# Patient Record
Sex: FEMALE | Race: WHITE | NOT HISPANIC OR LATINO | Employment: FULL TIME | URBAN - NONMETROPOLITAN AREA
[De-identification: names, ages, dates, MRNs, and addresses within clinical notes are randomized per-mention and may not be internally consistent; named-entity substitution may affect disease eponyms.]

---

## 2023-08-13 DIAGNOSIS — E87.6 HYPOKALEMIA: ICD-10-CM

## 2023-08-13 DIAGNOSIS — I10 ESSENTIAL (PRIMARY) HYPERTENSION: ICD-10-CM

## 2023-08-14 RX ORDER — POTASSIUM CHLORIDE 750 MG/1
10 CAPSULE, EXTENDED RELEASE ORAL DAILY
Qty: 90 CAPSULE | Refills: 3 | Status: SHIPPED | OUTPATIENT
Start: 2023-08-14 | End: 2024-01-26 | Stop reason: SDUPTHER

## 2023-08-14 RX ORDER — LOSARTAN POTASSIUM AND HYDROCHLOROTHIAZIDE 12.5; 5 MG/1; MG/1
1 TABLET ORAL DAILY
Qty: 90 TABLET | Refills: 1 | Status: SHIPPED | OUTPATIENT
Start: 2023-08-14 | End: 2024-01-26 | Stop reason: SDUPTHER

## 2023-08-14 RX ORDER — ALLOPURINOL 300 MG/1
300 TABLET ORAL DAILY
COMMUNITY
Start: 2023-06-12 | End: 2024-01-26 | Stop reason: ALTCHOICE

## 2023-08-14 RX ORDER — LOSARTAN POTASSIUM AND HYDROCHLOROTHIAZIDE 12.5; 5 MG/1; MG/1
1 TABLET ORAL DAILY
COMMUNITY

## 2023-08-14 RX ORDER — POTASSIUM CHLORIDE 750 MG/1
10 CAPSULE, EXTENDED RELEASE ORAL DAILY
COMMUNITY

## 2023-10-03 DIAGNOSIS — Z12.11 SCREENING FOR COLON CANCER: ICD-10-CM

## 2023-12-16 ENCOUNTER — HOSPITAL ENCOUNTER (OUTPATIENT)
Dept: RADIOLOGY | Facility: HOSPITAL | Age: 58
Discharge: HOME | End: 2023-12-16
Payer: COMMERCIAL

## 2023-12-16 DIAGNOSIS — Z12.31 ENCOUNTER FOR SCREENING MAMMOGRAM FOR MALIGNANT NEOPLASM OF BREAST: ICD-10-CM

## 2024-01-26 ENCOUNTER — OFFICE VISIT (OUTPATIENT)
Dept: OBSTETRICS AND GYNECOLOGY | Facility: CLINIC | Age: 59
End: 2024-01-26
Payer: COMMERCIAL

## 2024-01-26 VITALS
DIASTOLIC BLOOD PRESSURE: 87 MMHG | BODY MASS INDEX: 38.74 KG/M2 | SYSTOLIC BLOOD PRESSURE: 156 MMHG | HEIGHT: 65 IN | HEART RATE: 76 BPM | WEIGHT: 232.5 LBS

## 2024-01-26 DIAGNOSIS — Z12.4 CERVICAL CANCER SCREENING: ICD-10-CM

## 2024-01-26 DIAGNOSIS — Z01.419 WOMEN'S ANNUAL ROUTINE GYNECOLOGICAL EXAMINATION: Primary | ICD-10-CM

## 2024-01-26 PROCEDURE — 87624 HPV HI-RISK TYP POOLED RSLT: CPT | Performed by: OBSTETRICS & GYNECOLOGY

## 2024-01-26 PROCEDURE — 99386 PREV VISIT NEW AGE 40-64: CPT | Performed by: OBSTETRICS & GYNECOLOGY

## 2024-01-26 PROCEDURE — 1036F TOBACCO NON-USER: CPT | Performed by: OBSTETRICS & GYNECOLOGY

## 2024-01-26 PROCEDURE — 88175 CYTOPATH C/V AUTO FLUID REDO: CPT | Mod: TC | Performed by: OBSTETRICS & GYNECOLOGY

## 2024-01-26 ASSESSMENT — PAIN SCALES - GENERAL: PAINLEVEL: 0-NO PAIN

## 2024-01-26 NOTE — PROGRESS NOTES
"      Taya Olivier 58 y.o. y/o who presents for annual gyn exam.      Preventive health  - Pap due, cotest  - mammogram - has appointment this month  - c-scope due in 2028    Concern for pelvic organ prolapse  - no evidence on exam (minimal posterior wall prolpase), but nothing to match the description that I first suspected on patient's exam.  Discussed possibility that does have prolapse, can be intermittent.  No associated urinary symptoms.  Will continue to monitor.      -------------------------------------  HPI    No Pap in years  Had pain/trauma with last exam, and has had fears about returning    Thinks that something is falling out  Was told by  that he felt \"ridges\" during intercourse  Has not had sex in 7 months    Denies vaginal bleeding  No urinary symptoms  previously had urinary urgency previously, states this resolved with initiation of losartan for elevated BP  Up to date on colonscopy    Gynecologic History:    Menses: LMP 2 years ago  Monthly premenopausal  No vaginal bleeding since menopause.  Last Pap: 21 years ago  Sexually active: yes, but not for months due to 's/her concern       OB History:   P3  SVDs, no complications      Vitals:    01/26/24 0832   BP: 156/87   Pulse: 76       Physical Exam  Constitutional:       Appearance: Normal appearance.   Genitourinary:      Vulva and bladder normal.      No vaginal erythema, bleeding or ulceration.      Posterior vaginal prolapse present.     Mild vaginal atrophy present.     Cervix is parous.      No urethral prolapse or mass present.      Levator ani not tender.  HENT:      Head: Normocephalic and atraumatic.   Pulmonary:      Effort: Pulmonary effort is normal.   Musculoskeletal:      Cervical back: Neck supple.   Neurological:      Mental Status: She is alert.   Skin:     General: Skin is warm and dry.   Psychiatric:         Mood and Affect: Mood normal.         Thought Content: Thought content normal.     No evidence of " significant pelvic organ prolapse

## 2024-01-29 ENCOUNTER — HOSPITAL ENCOUNTER (OUTPATIENT)
Dept: RADIOLOGY | Facility: HOSPITAL | Age: 59
Discharge: HOME | End: 2024-01-29
Payer: COMMERCIAL

## 2024-01-29 PROCEDURE — 77063 BREAST TOMOSYNTHESIS BI: CPT | Mod: BILATERAL PROCEDURE | Performed by: RADIOLOGY

## 2024-01-29 PROCEDURE — 77067 SCR MAMMO BI INCL CAD: CPT | Mod: BILATERAL PROCEDURE | Performed by: RADIOLOGY

## 2024-01-29 PROCEDURE — 77067 SCR MAMMO BI INCL CAD: CPT

## 2024-01-30 ENCOUNTER — TELEPHONE (OUTPATIENT)
Dept: SURGERY | Facility: CLINIC | Age: 59
End: 2024-01-30
Payer: COMMERCIAL

## 2024-01-30 NOTE — TELEPHONE ENCOUNTER
----- Message from Remigio Ferrer MD sent at 1/29/2024  5:39 PM EST -----  Let her know her mammogram was normal.  She can continue yearly screening mammogram

## 2024-01-31 ENCOUNTER — TELEPHONE (OUTPATIENT)
Dept: SURGERY | Facility: CLINIC | Age: 59
End: 2024-01-31
Payer: COMMERCIAL

## 2024-02-01 ENCOUNTER — TELEPHONE (OUTPATIENT)
Dept: SURGERY | Facility: CLINIC | Age: 59
End: 2024-02-01
Payer: COMMERCIAL

## 2024-02-01 NOTE — TELEPHONE ENCOUNTER
----- Message from Remigio eFrrer MD sent at 1/29/2024  5:39 PM EST -----  Let her know her mammogram was normal.  She can continue yearly screening mammogram

## 2024-02-08 LAB
CYTOLOGY CMNT CVX/VAG CYTO-IMP: NORMAL
HPV HR 12 DNA GENITAL QL NAA+PROBE: NEGATIVE
HPV HR GENOTYPES PNL CVX NAA+PROBE: NEGATIVE
HPV16 DNA SPEC QL NAA+PROBE: NEGATIVE
HPV18 DNA SPEC QL NAA+PROBE: NEGATIVE
LAB AP HPV GENOTYPE QUESTION: YES
LAB AP HPV HR: NORMAL
LABORATORY COMMENT REPORT: NORMAL
PATH REPORT.TOTAL CANCER: NORMAL

## 2024-06-24 ENCOUNTER — TELEPHONE (OUTPATIENT)
Dept: PRIMARY CARE | Facility: CLINIC | Age: 59
End: 2024-06-24
Payer: COMMERCIAL

## 2024-06-24 DIAGNOSIS — I10 PRIMARY HYPERTENSION: Primary | ICD-10-CM

## 2024-06-24 RX ORDER — LOSARTAN POTASSIUM AND HYDROCHLOROTHIAZIDE 12.5; 5 MG/1; MG/1
1 TABLET ORAL DAILY
Qty: 30 TABLET | Refills: 0 | Status: SHIPPED | OUTPATIENT
Start: 2024-06-24 | End: 2024-07-24

## 2024-06-24 NOTE — TELEPHONE ENCOUNTER
Taya has not been seen in office since January 202. I explained that she is technically no longer a patient and that I cannot guarantee her medication will get filled, but she is currently in the process of transferring jobs to [place], [place] and she forgot her medications. She only needs a weeks worth of losartan-hydrochlorothiazide.

## 2024-07-18 DIAGNOSIS — I10 PRIMARY HYPERTENSION: ICD-10-CM

## 2024-07-22 RX ORDER — FEBUXOSTAT 40 MG/1
1 TABLET, FILM COATED ORAL
COMMUNITY
Start: 2024-04-19

## 2024-07-22 RX ORDER — LOSARTAN POTASSIUM AND HYDROCHLOROTHIAZIDE 12.5; 5 MG/1; MG/1
1 TABLET ORAL DAILY
Qty: 90 TABLET | Refills: 1 | Status: SHIPPED | OUTPATIENT
Start: 2024-07-22

## 2024-09-11 NOTE — TELEPHONE ENCOUNTER
Pt notified   Unable to obtain oral temp. Axillary temp 96.0. Demario Kiraner applied.   [Abdominal Pain] : abdominal pain [Nausea] : no nausea [Constipation] : constipation [Diarrhea] : diarrhea [Vomiting] : no vomiting [Heartburn] : no heartburn [Melena] : no melena [Negative] : Heme/Lymph

## 2024-10-15 NOTE — PROGRESS NOTES
Subjective   Patient ID: Taya Olivier is a 59 y.o. female who presents for No chief complaint on file..    HTN: No SE's on losartan, HCTZ. She is on potassium replacement. She has been out of her HCTZ for a week. She did not take her medication yet today. No side effects.     h/o CVA, vision changes: getting injections in her left eye for floaters. She was seeing Dr. Foote from [facility]. She is cleared unless she has another problem.     Nephrolithiasis: She was found to have a kidney stone. She is seeing a urologist in [place] for a procedure. She has had kidney stones before. She is not in pain today. She is planning for procedure in March.     Breast mass: Biopsy and removal completed by gen surg, found to be benign. She is doing well.     Both of her kids are theater majors. Her son gets work pretty frequently but her daughter is not as motivated. She is going to [employer].     All other systems have been reviewed and are negative for complaint     Objective   There were no vitals taken for this visit.    Gen: No acute distress, alert and oriented x3, pleasant   HEENT: moist mucous membranes, b/l external auditory canals are clear of debris, TMs within normal limits, no oropharyngeal lesions, eomi, perrla   Neck: thyroid within normal limits, no lymphadenopathy   CV: RRR, normal S1/S2, no murmur   Resp: Clear to auscultation bilaterally, no wheezes or rhonchi appreciated  Abd: soft, nontender, non-distended, no guarding/rigidity, bowel sounds present  Extr: no edema, no calf tenderness  Derm: Skin is warm and dry, no rashes appreciated  Psych: mood is good, affect is congruent, good hygiene, normal speech and eye contact  Neuro: cranial nerves grossly intact, normal gait    Assessment/Plan   #HTN  No issues with combo pill     #Hypokalemia  stable      #Hyperglycemia:  Monitoring a1c     #Nephrolithiasis:  Planning for procedure with urology in March     #Intraductal papilloma:  s/p biopsy  and removal, found to be benign  Ordering mammogram today     #h/o CVA:  Vision is much better  f/u as needed with retinal specialist     Health Maintenance  Ordering lipid panel  She is planning to do well woman exam with new gyn in [place]

## 2024-10-17 ENCOUNTER — OFFICE VISIT (OUTPATIENT)
Dept: PRIMARY CARE | Facility: CLINIC | Age: 59
End: 2024-10-17

## 2024-10-17 VITALS
BODY MASS INDEX: 36.8 KG/M2 | WEIGHT: 229 LBS | HEART RATE: 74 BPM | SYSTOLIC BLOOD PRESSURE: 135 MMHG | DIASTOLIC BLOOD PRESSURE: 80 MMHG | HEIGHT: 66 IN

## 2024-10-17 DIAGNOSIS — R73.09 ELEVATED GLUCOSE: Primary | ICD-10-CM

## 2024-10-17 DIAGNOSIS — Z78.9 HEALTH MANAGEMENT DEFICIT: ICD-10-CM

## 2024-10-17 PROCEDURE — 1036F TOBACCO NON-USER: CPT | Performed by: FAMILY MEDICINE

## 2024-10-17 PROCEDURE — 3008F BODY MASS INDEX DOCD: CPT | Performed by: FAMILY MEDICINE

## 2024-10-17 PROCEDURE — 99214 OFFICE O/P EST MOD 30 MIN: CPT | Performed by: FAMILY MEDICINE

## 2024-10-17 NOTE — PROGRESS NOTES
"Subjective   Patient ID: Tyaa Olivier is a 59 y.o. female who presents for Follow-up (\"I can't loose wt\"; she walks; go to the gym with no results;).    HTN: No SE's on losartan, HCTZ. She is on potassium replacement.      h/o CVA, vision changes: getting injections in her left eye for floaters. She was seeing Dr. Foote from [facility]. She is cleared unless she has another problem.     Nephrolithiasis: She is on uloric. She had extensive testing done. Following with urology. She is not eating red meat anymore. Following with Dr. Chao for urology but she will be seeing a new doc at [place] the next time she goes.      Breast mass: Biopsy and removal completed by gen surg, found to be benign. She is doing well.     One is in [place], One in [place], and one in [place]. She has been working for [employer] for many years but since the [place] store has closed she is in [facility] for 18mo while waiting for a position     All other systems have been reviewed and are negative for complaint     Objective   /84 (BP Location: Right arm, Patient Position: Sitting, BP Cuff Size: Adult)   Pulse 74   Ht 1.676 m (5' 6\")   Wt 104 kg (229 lb)   BMI 36.96 kg/m²     Gen: No acute distress, alert and oriented x3, pleasant   HEENT: moist mucous membranes, b/l external auditory canals are clear of debris, TMs within normal limits, no oropharyngeal lesions, eomi, perrla   Neck: thyroid within normal limits, no lymphadenopathy   CV: RRR, normal S1/S2, no murmur   Resp: Clear to auscultation bilaterally, no wheezes or rhonchi appreciated  Abd: soft, nontender, non-distended, no guarding/rigidity, bowel sounds present  Extr: no edema, no calf tenderness  Derm: Skin is warm and dry, no rashes appreciated  Psych: mood is good, affect is congruent, good hygiene, normal speech and eye contact  Neuro: cranial nerves grossly intact, normal gait    Assessment/Plan   #Obesity:  Discussed weight loss medication " options  Check labs  She will query about GLP-1 coverage with insurance also    #HTN  #Hypokalemia  Reasonable control on losartan_HCTZ with potassium     #Hyperglycemia:  Monitoring a1c     #Nephrolithiasis:  On Uloric     #Intraductal papilloma:  Following with surgeon     #h/o CVA:  Vision is much better  f/u as needed with retinal specialist     Health Maintenance  Ordering lipid panel  She is planning to do well woman exam with new gyn in [place]   Pap done 1/2024  Mammogram [facility] [place]

## 2024-10-17 NOTE — PATIENT INSTRUCTIONS
If you're motivated to try a GLP-1, I would call your insurance and ask if they cover this for weight loss.  The most common ones would be trulicity, ozempic, mounjaro, wegovy, and zepbound.

## 2024-10-18 ENCOUNTER — APPOINTMENT (OUTPATIENT)
Dept: PRIMARY CARE | Facility: CLINIC | Age: 59
End: 2024-10-18
Payer: COMMERCIAL

## 2024-10-18 ENCOUNTER — LAB (OUTPATIENT)
Dept: LAB | Facility: LAB | Age: 59
End: 2024-10-18
Payer: COMMERCIAL

## 2024-10-18 DIAGNOSIS — R73.09 ELEVATED GLUCOSE: ICD-10-CM

## 2024-10-18 DIAGNOSIS — Z78.9 HEALTH MANAGEMENT DEFICIT: ICD-10-CM

## 2024-10-18 LAB
ALBUMIN SERPL BCP-MCNC: 4.1 G/DL (ref 3.4–5)
ALP SERPL-CCNC: 85 U/L (ref 33–110)
ALT SERPL W P-5'-P-CCNC: 37 U/L (ref 7–45)
ANION GAP SERPL CALC-SCNC: 11 MMOL/L (ref 10–20)
AST SERPL W P-5'-P-CCNC: 27 U/L (ref 9–39)
BASOPHILS # BLD AUTO: 0.04 X10*3/UL (ref 0–0.1)
BASOPHILS NFR BLD AUTO: 0.5 %
BILIRUB SERPL-MCNC: 0.6 MG/DL (ref 0–1.2)
BUN SERPL-MCNC: 20 MG/DL (ref 6–23)
CALCIUM SERPL-MCNC: 9 MG/DL (ref 8.6–10.3)
CHLORIDE SERPL-SCNC: 104 MMOL/L (ref 98–107)
CHOLEST SERPL-MCNC: 233 MG/DL (ref 0–199)
CHOLESTEROL/HDL RATIO: 6.3
CO2 SERPL-SCNC: 31 MMOL/L (ref 21–32)
CREAT SERPL-MCNC: 0.95 MG/DL (ref 0.5–1.05)
EGFRCR SERPLBLD CKD-EPI 2021: 69 ML/MIN/1.73M*2
EOSINOPHIL # BLD AUTO: 0.13 X10*3/UL (ref 0–0.7)
EOSINOPHIL NFR BLD AUTO: 1.5 %
ERYTHROCYTE [DISTWIDTH] IN BLOOD BY AUTOMATED COUNT: 12.4 % (ref 11.5–14.5)
EST. AVERAGE GLUCOSE BLD GHB EST-MCNC: 134 MG/DL
GLUCOSE SERPL-MCNC: 143 MG/DL (ref 74–99)
HBA1C MFR BLD: 6.3 %
HCT VFR BLD AUTO: 44.4 % (ref 36–46)
HDLC SERPL-MCNC: 37.2 MG/DL
HGB BLD-MCNC: 14.2 G/DL (ref 12–16)
IMM GRANULOCYTES # BLD AUTO: 0.03 X10*3/UL (ref 0–0.7)
IMM GRANULOCYTES NFR BLD AUTO: 0.3 % (ref 0–0.9)
LDLC SERPL CALC-MCNC: 143 MG/DL
LYMPHOCYTES # BLD AUTO: 2.64 X10*3/UL (ref 1.2–4.8)
LYMPHOCYTES NFR BLD AUTO: 29.9 %
MCH RBC QN AUTO: 27.6 PG (ref 26–34)
MCHC RBC AUTO-ENTMCNC: 32 G/DL (ref 32–36)
MCV RBC AUTO: 86 FL (ref 80–100)
MONOCYTES # BLD AUTO: 0.63 X10*3/UL (ref 0.1–1)
MONOCYTES NFR BLD AUTO: 7.1 %
NEUTROPHILS # BLD AUTO: 5.35 X10*3/UL (ref 1.2–7.7)
NEUTROPHILS NFR BLD AUTO: 60.7 %
NON HDL CHOLESTEROL: 196 MG/DL (ref 0–149)
NRBC BLD-RTO: 0 /100 WBCS (ref 0–0)
PLATELET # BLD AUTO: 201 X10*3/UL (ref 150–450)
POTASSIUM SERPL-SCNC: 4 MMOL/L (ref 3.5–5.3)
PROT SERPL-MCNC: 6.6 G/DL (ref 6.4–8.2)
RBC # BLD AUTO: 5.14 X10*6/UL (ref 4–5.2)
SODIUM SERPL-SCNC: 142 MMOL/L (ref 136–145)
TRIGL SERPL-MCNC: 262 MG/DL (ref 0–149)
VLDL: 52 MG/DL (ref 0–40)
WBC # BLD AUTO: 8.8 X10*3/UL (ref 4.4–11.3)

## 2024-10-18 PROCEDURE — 80061 LIPID PANEL: CPT

## 2024-10-18 PROCEDURE — 83036 HEMOGLOBIN GLYCOSYLATED A1C: CPT

## 2024-10-18 PROCEDURE — 80053 COMPREHEN METABOLIC PANEL: CPT

## 2024-10-18 PROCEDURE — 85025 COMPLETE CBC W/AUTO DIFF WBC: CPT

## 2024-10-18 PROCEDURE — 36415 COLL VENOUS BLD VENIPUNCTURE: CPT

## 2025-01-24 NOTE — PROGRESS NOTES
"Subjective   Patient ID: Taya Olivier is a 59 y.o. female who presents for Follow-up (4 months).    HTN: No SE's on losartan, HCTZ. She is on potassium replacement.      h/o CVA, vision changes: getting injections in her left eye for floaters. She was seeing Dr. Foote from [facility]. She is cleared unless she has another problem.     Nephrolithiasis: She is on uloric. She had extensive testing done. Following with urology. She is not eating red meat anymore. Following with Dr. Chao for urology but she will be seeing a new doc at [place] the next time she goes.      Breast mass: Biopsy and removal completed by gen surg, found to be benign. She is doing well.     One is in [place], One in [place], and one in [place]. She has been working for [employer] for many years but since the [place] store has closed she is in [facility] for 18mo while waiting for a position     All other systems have been reviewed and are negative for complaint     Objective   /82 (BP Location: Left arm, Patient Position: Sitting, BP Cuff Size: Large adult)   Pulse 76   Ht 1.676 m (5' 6\")   Wt 102 kg (225 lb)   BMI 36.32 kg/m²     Gen: No acute distress, alert and oriented x3, pleasant   HEENT: moist mucous membranes, b/l external auditory canals are clear of debris, TMs within normal limits, no oropharyngeal lesions, eomi, perrla   Neck: thyroid within normal limits, no lymphadenopathy   CV: RRR, normal S1/S2, no murmur   Resp: Clear to auscultation bilaterally, no wheezes or rhonchi appreciated  Abd: soft, nontender, non-distended, no guarding/rigidity, bowel sounds present  Extr: no edema, no calf tenderness  Derm: Skin is warm and dry, no rashes appreciated  Psych: mood is good, affect is congruent, good hygiene, normal speech and eye contact  Neuro: cranial nerves grossly intact, normal gait    Assessment/Plan   #Obesity:  #Prediabetes  Rx sent ozempic     #HTN  #Hypokalemia  Reasonable control on losartan_HCTZ with " potassium    #HLD:  Discussed diet  [place] currently at 6.9%     #Hyperglycemia:  Monitoring a1c     #Nephrolithiasis:  On Uloric     #Intraductal papilloma:  Following with surgeon     #h/o CVA:  Vision is much better  f/u as needed with retinal specialist     Health Maintenance  Ordering lipid panel  She is planning to do well woman exam with new gyn in [place]   Pap done 1/2024  Mammogram [facility] [place]

## 2025-01-28 DIAGNOSIS — I10 PRIMARY HYPERTENSION: ICD-10-CM

## 2025-01-28 RX ORDER — LOSARTAN POTASSIUM AND HYDROCHLOROTHIAZIDE 12.5; 5 MG/1; MG/1
1 TABLET ORAL DAILY
Qty: 90 TABLET | Refills: 1 | Status: SHIPPED | OUTPATIENT
Start: 2025-01-28

## 2025-02-03 ENCOUNTER — APPOINTMENT (OUTPATIENT)
Dept: PRIMARY CARE | Facility: CLINIC | Age: 60
End: 2025-02-03
Payer: COMMERCIAL

## 2025-02-03 VITALS
HEART RATE: 76 BPM | HEIGHT: 66 IN | WEIGHT: 225 LBS | BODY MASS INDEX: 36.16 KG/M2 | SYSTOLIC BLOOD PRESSURE: 137 MMHG | DIASTOLIC BLOOD PRESSURE: 82 MMHG

## 2025-02-03 DIAGNOSIS — R73.03 PREDIABETES: Primary | ICD-10-CM

## 2025-02-03 DIAGNOSIS — Z12.31 ENCOUNTER FOR SCREENING MAMMOGRAM FOR MALIGNANT NEOPLASM OF BREAST: ICD-10-CM

## 2025-02-03 PROCEDURE — 3008F BODY MASS INDEX DOCD: CPT | Performed by: FAMILY MEDICINE

## 2025-02-03 PROCEDURE — 99214 OFFICE O/P EST MOD 30 MIN: CPT | Performed by: FAMILY MEDICINE

## 2025-02-03 PROCEDURE — 1036F TOBACCO NON-USER: CPT | Performed by: FAMILY MEDICINE

## 2025-02-04 ENCOUNTER — TELEPHONE (OUTPATIENT)
Dept: PRIMARY CARE | Facility: CLINIC | Age: 60
End: 2025-02-04
Payer: COMMERCIAL

## 2025-02-04 DIAGNOSIS — R73.03 PREDIABETES: Primary | ICD-10-CM

## 2025-02-04 RX ORDER — DULAGLUTIDE 0.75 MG/.5ML
0.75 INJECTION, SOLUTION SUBCUTANEOUS WEEKLY
Qty: 2 ML | Refills: 11 | Status: SHIPPED | OUTPATIENT
Start: 2025-02-04

## 2025-02-12 PROBLEM — E87.6 HYPOKALEMIA: Status: ACTIVE | Noted: 2025-02-12

## 2025-02-12 PROBLEM — D24.2 INTRADUCTAL PAPILLOMA OF LEFT BREAST: Status: ACTIVE | Noted: 2025-02-12

## 2025-02-12 PROBLEM — R92.8 ABNORMAL MAMMOGRAM OF LEFT BREAST: Status: RESOLVED | Noted: 2025-02-12 | Resolved: 2025-02-12

## 2025-02-12 PROBLEM — R73.09 ELEVATED GLUCOSE: Status: ACTIVE | Noted: 2025-02-12

## 2025-02-12 PROBLEM — I10 BENIGN ESSENTIAL HTN: Status: ACTIVE | Noted: 2025-02-12

## 2025-02-12 PROBLEM — R10.9 ABDOMINAL PAIN: Status: RESOLVED | Noted: 2025-02-12 | Resolved: 2025-02-12

## 2025-02-12 PROBLEM — K42.9 UMBILICAL HERNIA: Status: RESOLVED | Noted: 2025-02-12 | Resolved: 2025-02-12

## 2025-02-12 PROBLEM — N64.52 NIPPLE DISCHARGE, BLOODY: Status: RESOLVED | Noted: 2025-02-12 | Resolved: 2025-02-12

## 2025-02-12 PROBLEM — N20.1 URETERAL STONE: Status: RESOLVED | Noted: 2020-06-23 | Resolved: 2025-02-12

## 2025-03-03 ENCOUNTER — TELEPHONE (OUTPATIENT)
Dept: PRIMARY CARE | Facility: CLINIC | Age: 60
End: 2025-03-03
Payer: COMMERCIAL

## 2025-03-03 NOTE — TELEPHONE ENCOUNTER
Pt called in asking about the PA for her trulicity. States the pharmacy  told her you were using the wrong insurance benefits. Told her you were out of office today but I would get the information over to you and see if you would touch back with her insurance tomorrow or with to get more information.

## 2025-03-05 NOTE — TELEPHONE ENCOUNTER
The ID number she called in is the same number that was used for her previous PA that was denied.

## 2025-03-06 ENCOUNTER — TELEPHONE (OUTPATIENT)
Dept: PRIMARY CARE | Facility: CLINIC | Age: 60
End: 2025-03-06
Payer: COMMERCIAL

## 2025-03-06 DIAGNOSIS — E66.9 OBESITY, UNSPECIFIED CLASS, UNSPECIFIED OBESITY TYPE, UNSPECIFIED WHETHER SERIOUS COMORBIDITY PRESENT: Primary | ICD-10-CM

## 2025-03-06 RX ORDER — SEMAGLUTIDE 0.25 MG/.5ML
0.25 INJECTION, SOLUTION SUBCUTANEOUS WEEKLY
Qty: 2 ML | Refills: 0 | Status: SHIPPED | OUTPATIENT
Start: 2025-03-06 | End: 2025-03-28

## 2025-03-06 NOTE — TELEPHONE ENCOUNTER
Ok I will send in the low dose, plan to increase in a month but she will need to call back and let us know no side effects.

## 2025-03-06 NOTE — TELEPHONE ENCOUNTER
Her insurnace will not cover unless she is DM type 2. So she would like to try to go through [facility].     [facility]  Ph [phone]  Semaglutide (Wegovy) $99 per month for starting dose  1mg $189 per month  2.4mg $249 per month  Call pharmacy to arrange payment method  (Shipping fees not included)

## 2025-03-06 NOTE — TELEPHONE ENCOUNTER
Pt called in stating she was denied for Trulicity and was wondering what the next steps are. I told her to call her insurance and ask what they cover for this and she stated she did and they told her trulicity.

## 2025-04-08 DIAGNOSIS — R73.03 PREDIABETES: ICD-10-CM

## 2025-05-23 DIAGNOSIS — R73.03 PREDIABETES: ICD-10-CM

## 2025-05-27 ENCOUNTER — HOSPITAL ENCOUNTER (OUTPATIENT)
Dept: RADIOLOGY | Facility: HOSPITAL | Age: 60
Discharge: HOME | End: 2025-05-27
Payer: COMMERCIAL

## 2025-05-27 VITALS — BODY MASS INDEX: 35.36 KG/M2 | HEIGHT: 66 IN | WEIGHT: 220 LBS

## 2025-05-27 DIAGNOSIS — Z12.31 ENCOUNTER FOR SCREENING MAMMOGRAM FOR MALIGNANT NEOPLASM OF BREAST: ICD-10-CM

## 2025-05-27 PROCEDURE — 77067 SCR MAMMO BI INCL CAD: CPT | Performed by: RADIOLOGY

## 2025-05-27 PROCEDURE — 77067 SCR MAMMO BI INCL CAD: CPT

## 2025-05-27 PROCEDURE — 77063 BREAST TOMOSYNTHESIS BI: CPT | Performed by: RADIOLOGY

## 2025-06-04 NOTE — PROGRESS NOTES
"Subjective   Patient ID: Taya Olivier is a 60 y.o. female who presents for Follow-up (4 months).    Obesity: She is on higher dose ozempic and she lost about 15 lb since starting. Amanda is noticing more heartburn since the b12 was added.     HTN: No SE's on losartan, HCTZ. She is on potassium replacement.      h/o CVA, vision changes: getting injections in her left eye for floaters. She was seeing Dr. Foote from [facility]. She is cleared unless she has another problem.     Nephrolithiasis: She is on uloric. She had extensive testing done. Following with urology. She is not eating red meat anymore. Following with Dr. Chao for urology but she will be seeing a new doc at [place] the next time she goes.      Breast mass: Biopsy and removal completed by gen surg, found to be benign. She is doing well.     One is in [place], One in [place], and one in [place]. She has been working for [employer] for many years but since the [place] store has closed she is in [facility] for 18mo while waiting for a position     All other systems have been reviewed and are negative for complaint     Objective   /77 (BP Location: Left arm, Patient Position: Sitting, BP Cuff Size: Adult)   Pulse 71   Ht 1.676 m (5' 6\")   Wt 93 kg (205 lb)   BMI 33.09 kg/m²     Gen: No acute distress, alert and oriented x3, pleasant   HEENT: moist mucous membranes, b/l external auditory canals are clear of debris, TMs within normal limits, no oropharyngeal lesions, eomi, perrla   Neck: thyroid within normal limits, no lymphadenopathy   CV: RRR, normal S1/S2, no murmur   Resp: Clear to auscultation bilaterally, no wheezes or rhonchi appreciated  Abd: soft, nontender, non-distended, no guarding/rigidity, bowel sounds present  Extr: no edema, no calf tenderness  Derm: Skin is warm and dry, no rashes appreciated  Psych: mood is good, affect is congruent, good hygiene, normal speech and eye contact  Neuro: cranial nerves grossly intact, " normal gait      Assessment/Plan   #Obesity:  #Prediabetes  Doing well on wegovy     #HTN  #Hypokalemia  Reasonable control on losartan_HCTZ with potassium     #HLD:  Discussed diet  [place] currently at 6.9%     #Hyperglycemia:  Monitoring a1c     #Nephrolithiasis:  On Uloric     #Intraductal papilloma:  Following with surgeon     #h/o CVA:  Vision is much better  f/u as needed with retinal specialist     Health Maintenance  Ordering lipid panel  Pap done 1/2024, next due 2029  Mammogram [facility]

## 2025-06-04 NOTE — PATIENT INSTRUCTIONS
Lab Work:  Schedule an appointment for labs at [email or web address] or call [phone] 24 hours a day, 7 days a week.   You can also download the [facility] lab scheduling carla on your phone.     Radiology [phone]

## 2025-06-06 ENCOUNTER — APPOINTMENT (OUTPATIENT)
Dept: PRIMARY CARE | Facility: CLINIC | Age: 60
End: 2025-06-06
Payer: COMMERCIAL

## 2025-06-06 VITALS
HEIGHT: 66 IN | BODY MASS INDEX: 32.95 KG/M2 | DIASTOLIC BLOOD PRESSURE: 77 MMHG | WEIGHT: 205 LBS | HEART RATE: 71 BPM | SYSTOLIC BLOOD PRESSURE: 131 MMHG

## 2025-06-06 DIAGNOSIS — R73.03 PREDIABETES: Primary | ICD-10-CM

## 2025-06-06 PROCEDURE — 3078F DIAST BP <80 MM HG: CPT | Performed by: FAMILY MEDICINE

## 2025-06-06 PROCEDURE — 3075F SYST BP GE 130 - 139MM HG: CPT | Performed by: FAMILY MEDICINE

## 2025-06-06 PROCEDURE — 1036F TOBACCO NON-USER: CPT | Performed by: FAMILY MEDICINE

## 2025-06-06 PROCEDURE — 99214 OFFICE O/P EST MOD 30 MIN: CPT | Performed by: FAMILY MEDICINE

## 2025-06-06 PROCEDURE — 3008F BODY MASS INDEX DOCD: CPT | Performed by: FAMILY MEDICINE

## 2025-07-14 DIAGNOSIS — R73.03 PREDIABETES: ICD-10-CM

## 2025-08-01 DIAGNOSIS — I10 PRIMARY HYPERTENSION: ICD-10-CM

## 2025-08-01 RX ORDER — LOSARTAN POTASSIUM AND HYDROCHLOROTHIAZIDE 12.5; 5 MG/1; MG/1
1 TABLET ORAL DAILY
Qty: 30 TABLET | Refills: 5 | Status: SHIPPED | OUTPATIENT
Start: 2025-08-01

## 2025-08-13 DIAGNOSIS — R73.03 PREDIABETES: ICD-10-CM

## 2025-12-04 ENCOUNTER — APPOINTMENT (OUTPATIENT)
Dept: PRIMARY CARE | Facility: CLINIC | Age: 60
End: 2025-12-04
Payer: COMMERCIAL